# Patient Record
Sex: FEMALE | Race: WHITE | NOT HISPANIC OR LATINO | URBAN - METROPOLITAN AREA
[De-identification: names, ages, dates, MRNs, and addresses within clinical notes are randomized per-mention and may not be internally consistent; named-entity substitution may affect disease eponyms.]

---

## 2022-01-17 ENCOUNTER — EMERGENCY (EMERGENCY)
Facility: HOSPITAL | Age: 32
LOS: 1 days | Discharge: HOME | End: 2022-01-17
Admitting: EMERGENCY MEDICINE
Payer: COMMERCIAL

## 2022-01-17 VITALS
OXYGEN SATURATION: 99 % | SYSTOLIC BLOOD PRESSURE: 119 MMHG | RESPIRATION RATE: 16 BRPM | DIASTOLIC BLOOD PRESSURE: 74 MMHG | TEMPERATURE: 98 F | HEART RATE: 77 BPM

## 2022-01-17 VITALS
OXYGEN SATURATION: 100 % | HEART RATE: 89 BPM | SYSTOLIC BLOOD PRESSURE: 128 MMHG | RESPIRATION RATE: 18 BRPM | DIASTOLIC BLOOD PRESSURE: 77 MMHG | WEIGHT: 115.08 LBS | TEMPERATURE: 98 F

## 2022-01-17 DIAGNOSIS — R20.8 OTHER DISTURBANCES OF SKIN SENSATION: ICD-10-CM

## 2022-01-17 DIAGNOSIS — Z01.82 ENCOUNTER FOR ALLERGY TESTING: ICD-10-CM

## 2022-01-17 DIAGNOSIS — R21 RASH AND OTHER NONSPECIFIC SKIN ERUPTION: ICD-10-CM

## 2022-01-17 LAB
ALBUMIN SERPL ELPH-MCNC: 4 G/DL — SIGNIFICANT CHANGE UP (ref 3.4–5)
ALP SERPL-CCNC: 44 U/L — SIGNIFICANT CHANGE UP (ref 40–120)
ALT FLD-CCNC: 15 U/L — SIGNIFICANT CHANGE UP (ref 12–42)
ANION GAP SERPL CALC-SCNC: 8 MMOL/L — LOW (ref 9–16)
AST SERPL-CCNC: 13 U/L — LOW (ref 15–37)
BILIRUB SERPL-MCNC: 0.4 MG/DL — SIGNIFICANT CHANGE UP (ref 0.2–1.2)
BUN SERPL-MCNC: 12 MG/DL — SIGNIFICANT CHANGE UP (ref 7–23)
CALCIUM SERPL-MCNC: 8.8 MG/DL — SIGNIFICANT CHANGE UP (ref 8.5–10.5)
CHLORIDE SERPL-SCNC: 102 MMOL/L — SIGNIFICANT CHANGE UP (ref 96–108)
CO2 SERPL-SCNC: 28 MMOL/L — SIGNIFICANT CHANGE UP (ref 22–31)
CREAT SERPL-MCNC: 0.68 MG/DL — SIGNIFICANT CHANGE UP (ref 0.5–1.3)
GLUCOSE SERPL-MCNC: 149 MG/DL — HIGH (ref 70–99)
HCT VFR BLD CALC: 39.9 % — SIGNIFICANT CHANGE UP (ref 34.5–45)
HGB BLD-MCNC: 13.3 G/DL — SIGNIFICANT CHANGE UP (ref 11.5–15.5)
MCHC RBC-ENTMCNC: 29.5 PG — SIGNIFICANT CHANGE UP (ref 27–34)
MCHC RBC-ENTMCNC: 33.3 GM/DL — SIGNIFICANT CHANGE UP (ref 32–36)
MCV RBC AUTO: 88.5 FL — SIGNIFICANT CHANGE UP (ref 80–100)
NRBC # BLD: 0 /100 WBCS — SIGNIFICANT CHANGE UP (ref 0–0)
PLATELET # BLD AUTO: 231 K/UL — SIGNIFICANT CHANGE UP (ref 150–400)
POTASSIUM SERPL-MCNC: 4 MMOL/L — SIGNIFICANT CHANGE UP (ref 3.5–5.3)
POTASSIUM SERPL-SCNC: 4 MMOL/L — SIGNIFICANT CHANGE UP (ref 3.5–5.3)
PROT SERPL-MCNC: 7.2 G/DL — SIGNIFICANT CHANGE UP (ref 6.4–8.2)
RBC # BLD: 4.51 M/UL — SIGNIFICANT CHANGE UP (ref 3.8–5.2)
RBC # FLD: 12.6 % — SIGNIFICANT CHANGE UP (ref 10.3–14.5)
SODIUM SERPL-SCNC: 138 MMOL/L — SIGNIFICANT CHANGE UP (ref 132–145)
TSH SERPL-MCNC: 0.86 UIU/ML — SIGNIFICANT CHANGE UP (ref 0.36–3.74)
WBC # BLD: 7.93 K/UL — SIGNIFICANT CHANGE UP (ref 3.8–10.5)
WBC # FLD AUTO: 7.93 K/UL — SIGNIFICANT CHANGE UP (ref 3.8–10.5)

## 2022-01-17 PROCEDURE — 99284 EMERGENCY DEPT VISIT MOD MDM: CPT

## 2022-01-17 RX ORDER — DIPHENHYDRAMINE HCL 50 MG
25 CAPSULE ORAL ONCE
Refills: 0 | Status: COMPLETED | OUTPATIENT
Start: 2022-01-17 | End: 2022-01-17

## 2022-01-17 RX ORDER — FAMOTIDINE 10 MG/ML
20 INJECTION INTRAVENOUS ONCE
Refills: 0 | Status: COMPLETED | OUTPATIENT
Start: 2022-01-17 | End: 2022-01-17

## 2022-01-17 RX ORDER — DEXAMETHASONE 0.5 MG/5ML
10 ELIXIR ORAL ONCE
Refills: 0 | Status: COMPLETED | OUTPATIENT
Start: 2022-01-17 | End: 2022-01-17

## 2022-01-17 RX ADMIN — Medication 102 MILLIGRAM(S): at 17:05

## 2022-01-17 RX ADMIN — Medication 25 MILLIGRAM(S): at 17:05

## 2022-01-17 RX ADMIN — FAMOTIDINE 20 MILLIGRAM(S): 10 INJECTION INTRAVENOUS at 17:05

## 2022-01-17 NOTE — ED PROVIDER NOTE - CARE PLAN
Principal Discharge DX:	Skin rash   1 Principal Discharge DX:	Allergic rash present on examination

## 2022-01-17 NOTE — ED PROVIDER NOTE - NSFOLLOWUPINSTRUCTIONS_ED_ALL_ED_FT
1. Follow up with your Dermatologist appointment in 2 weeks  2. Return to the ED if worsening symptoms such as shortness of breath or throat tightness.    Rash    A rash is a change in the color of the skin. A rash can also change the way your skin feels. There are many different conditions and factors that can cause a rash, most of which are not dangerous. Make sure to follow up with your primary care physician or a dermatologist as instructed by your health care provider.    SEEK IMMEDIATE MEDICAL CARE IF YOU HAVE ANY OF THE FOLLOWING SYMPTOMS: fever, blisters, a rash inside your mouth, vaginal or anal pain, or altered mental status.

## 2022-01-17 NOTE — ED PROVIDER NOTE - CLINICAL SUMMARY MEDICAL DECISION MAKING FREE TEXT BOX
Skin rash with throat tightness x 10 days sent by dermatologist  labs & medications in ED, observe for symptoms relief. Skin rash with throat tightness x 10 days sent by dermatologist  labs & medications in ED, observe for symptoms relief.  labs - wnl. patient know to follow up with sent outs (per dermatology request)  symptoms improved in ED. Has Rx for Prednisone from dermatologist

## 2022-01-17 NOTE — ED PROVIDER NOTE - ENMT, MLM
Airway patent, (-) stridor,  Nasal mucosa clear. Mouth with normal mucosa. Throat has no vesicles, no oropharyngeal exudates and uvula is midline.

## 2022-01-17 NOTE — ED PROVIDER NOTE - PATIENT PORTAL LINK FT
normal... You can access the FollowMyHealth Patient Portal offered by [facility] by registering at the following website: [email or web address]. By joining [employer]’s FollowMyHealth portal, you will also be able to view your health information using other applications (apps) compatible with our system.

## 2022-01-17 NOTE — ED ADULT TRIAGE NOTE - CHIEF COMPLAINT QUOTE
pt sent by dermatologist for eval of rashes/hives x 2 weeks with intermittent anaphylaxis symptoms, concerned for autoimmune disorder. failed 3 day course of prednisone, requesting bloodwork and possible IV steroids

## 2022-01-17 NOTE — ED ADULT NURSE NOTE - NS ED NURSE DISCH DISPOSITION
Assessment  1  Preoperative examination (V72 84) (Z01 818)   2  Deformity of toe of left foot (735 9) (M20 62)    Plan  Preoperative examination    · EKG/ECG- POC; Status:Complete - Retrospective By Protocol Authorization;   Done:  81NMN7676 08:15AM    Discussion/Summary  Surgical Clearance: She is at a LOW risk from a cardiovascular standpoint at this time without any additional cardiac testing  Reevaluation needed, if she should present with symptoms prior to surgery/procedure  Surgical clearance faxed to Dr Pedro Page   Chief Complaint  Pt here for pre-opfoot       History of Present Illness  Pre-Op Visit (Brief): The patient is being seen for a preoperative visit  Surgical Risk Assessment:   Prior Anesthesia: She had prior anesthesia,-- no prior adverse reaction to edidural anesthesia,-- no prior adverse reaction to spinal anesthesia-- and-- no prior adverse reaction to general anesthesia  Pertinent Past Medical History: no CAD,-- no chronic liver disease,-- no acute hepatitis,-- no coagulation delay,-- no primary hypercoagulable state,-- no secondary hypercoagulable state,-- no diabetes,-- no CVA,-- no COPD-- and-- no renal disease  Exercise Capacity: able to walk four blocks without symptoms-- and-- able to walk two flights of stairs without symptoms  Lifestyle Factors: denies tobacco use and denies illegal drug use  Symptoms: no symptoms  Pertinent Family History: no pertinent family history  Living Situation: home is secure and supportive  HPI: foot bunionectomy + repair of toe deformity scheduled 11/3 with Dr Pedro Page  Monitored anesthesia  Had right foot surgery back in May  Went well, no complications  Looking forward to getting other foot done  No pain at present  No analgesics  acute complaints  PSH, FH reviewed  No perioperative issues  years ago (1980's)  Subsequent negative w/u  No recurrent clotting issues since  results from 10/16 reviewed (CBC, CMP)-->WNL            Review of Systems    Constitutional: no fever  ENT: no sore throat  Cardiovascular: no chest pain-- and-- no palpitations  Respiratory: no shortness of breath-- and-- no cough  Gastrointestinal: no abdominal pain,-- no nausea,-- no vomiting,-- no constipation,-- no diarrhea-- and-- no blood in stools  Genitourinary: no dysuria  Neurological: no headache-- and-- no dizziness  Hematologic/Lymphatic: no swollen glands,-- no tendency for easy bleeding-- and-- no tendency for easy bruising  Active Problems  1  History of Asthma, mild intermittent (493 90) (J45 20)   2  Back pain, chronic (724 5,338 29) (M54 9,G89 29)   3  Bunion, right foot (727 1) (M21 611)   4  Cervical post-laminectomy syndrome (722 81) (M96 1)   5  DJD (degenerative joint disease) of thoracic spine (721 2) (M47 814)   6  Encounter for postoperative care (V58 49) (Z48 89)   7  Encounter for screening mammogram for breast cancer (V76 12) (Z12 31)   8  Foot pain, bilateral (729 5) (M79 671,M79 672)   9  History of anemia (V12 3) (Z86 2)   10  History of gastroesophageal reflux (GERD) (V12 79) (Z87 19)   11  History of vitamin D deficiency (V12 1) (Z86 39)   12  Laboratory exam ordered as part of routine general medical examination (V72 62)    (Z00 00)   13  History of Left hip pain (719 45) (M25 552)   14  Leukopenia (288 50) (D72 819)   15  Myofascial pain syndrome (729 1) (M79 1)   16  Osteopenia (733 90) (M85 80)   17  Pain syndrome, chronic (338 4) (G89 4)   18  Postprocedural state (V45 89) (Z98 890)   19  Preoperative examination (V72 84) (Z01 818)   20  Preoperative examination (V72 84) (Z01 818)   21  Thoracic neuralgia (729 2) (M79 2)   22  Thoracic spondylosis without myelopathy (721 2) (M47 814)   23  History of Toe deformity (735 9) (M20 60)   24   Vitiligo (709 01) (L80)    Past Medical History   · History of Anxiety disorder (300 00) (F41 9)   · History of Asthma (493 90) (J45 909)   · History of Asthma, mild intermittent (493 90) (J45 20)   · Bunion, right foot (727 1) (M21 611)   · History of Cervical cancer screening (V76 2) (Z12 4)   · Deformity of toe of left foot (735 9) (M20 62)   · History of Discitis of thoracolumbar region (722 92) (M46 45)   · Foot pain, bilateral (729 5) (M79 671,M79 672)   · History of Fracture Of Wrist And Hand (818)   · History of abdominal pain (V13 89) (L04 798)   · History of anemia (V12 3) (Z86 2)   · History of arthritis (V13 4) (Z87 39)   · History of backache (V13 59) (Z87 39)   · History of fall (V15 88) (Z91 81)   · History of fibrocystic disease of breast (V13 89) (F77 951)   · History of gastroesophageal reflux (GERD) (V12 79) (Z87 19)   · History of heartburn (V12 79) (E35 174)   · History of hypercholesterolemia (V12 29) (Z86 39)   · History of low back pain (V13 59) (Z87 39)   · History of nausea (V12 79) (Z87 898)   · History of polyarthritis (V13 4) (Z87 39)   · History of seasonal allergies (V15 09) (Z88 9)   · History of shortness of breath (V13 89) (B21 547)   · History of sleep disturbance (V13 89) (Z87 898)   · History of vitamin D deficiency (V12 1) (Z86 39)   · History of Indigestion (536 8) (K30)   · Laboratory exam ordered as part of routine general medical examination (V72 62)  (Z00 00)   · History of Left hip pain (719 45) (M25 552)   · Leukopenia (288 50) (D72 819)   · Osteopenia (733 90) (M85 80)   · Preoperative examination (V72 84) (Z01 818)   · Preoperative examination (V72 84) (Z01 818)   · History of Sore throat (462) (J02 9)   · History of Toe deformity (735 9) (M20 60)   · History of Trochanteric bursitis of left hip (726 5) (M70 62)   · Vitiligo (709 01) (L80)   · History of Wears eyeglasses (V49 89) (Z97 3)    The active problems and past medical history were reviewed and updated today        Surgical History   · History of Back Surgery   · History of  Section   · History of Cholecystectomy   · History of Hand Surgery   · History of Install Peripheral Nerve Neurostimulator By Incision   · History of Neck Surgery   · History of Neuroplasty Decompression Median Nerve At Carpal Tunnel   · History of Neuroplasty Ulnar Nerve    The surgical history was reviewed and updated today  Family History  Mother    · Family history of dementia (V17 2) (Z80 11)  Father    · Family history of Emphysema lung   · Family history of lung cancer (V16 1) (Z80 1)  Paternal Grandfather    · Family history of gangrene (V19 4) (Z84 0)  Family History    · Family history of Hypertension (V17 49)   · Family history of Reported Prior Back Trouble    The family history was reviewed and updated today  Social History   · Denied: History of Alcohol Use (History)   · Denied: History of Currently sexually active   · High school or GED   · Marital History - Currently    · Never smoked tobacco (V49 89) (Z78 9)   · No drug use   · No known risk factors   · No known STD risk factors   · Occasional alcohol use   · Physical Disability:   · Denied: History of Tobacco Use   · Two children  The social history was reviewed and updated today  The social history was reviewed and is unchanged  Current Meds   1  Melatonin 3 MG Oral Capsule; TAKE 1 CAPSULE AT BEDTIME AS NEEDED; Therapy: (Recorded:26Hxp4633) to Recorded   2  Omeprazole 20 MG Oral Capsule Delayed Release; TAKE 1 CAPSULE ONCE DAILY AS   NEEDED FOR REFLUX; Therapy: 36BDM6580 to ([phone])  Requested for: 04GAX8449; Last   Rx:04Iks0801 Ordered   3  Vitamin B-12 1000 MCG Oral Tablet; TAKE 1 TABLET DAILY AS DIRECTED; Therapy: (Recorded:30Bnb5021) to Recorded   4  Vitamin D CAPS; take 1 capsule daily; Therapy: (Recorded:30Hxy4828) to Recorded    The medication list was reviewed and updated today  Allergies  1  Lyrica CAPS   2  Percocet TABS   3   Influenza (Split PF)    Vitals   Recorded: 23Oct2017 07:56AM   Temperature 98 F, Tympanic   Heart Rate 74   Systolic 371   Diastolic 80   Height 5 ft 1 in   Weight 146 lb 6 oz   BMI Calculated 27 66   BSA Calculated 1 65   O2 Saturation 98     Physical Exam    Constitutional   General appearance: No acute distress, well appearing and well nourished  Head and Face   Head and face: Normal     Eyes   Conjunctiva and lids: No swelling, erythema or discharge  Pupils and irises: Equal, round, reactive to light  Ears, Nose, Mouth, and Throat   External inspection of ears and nose: Normal     Otoscopic examination: Tympanic membranes translucent with normal light reflex  Canals patent without erythema  Nasal mucosa, septum, and turbinates: Normal without edema or erythema  Oropharynx: Normal with no erythema, edema, exudate or lesions  Neck   Neck: Supple, symmetric, trachea midline, no masses  Pulmonary   Respiratory effort: No increased work of breathing or signs of respiratory distress  Auscultation of lungs: Clear to auscultation  Cardiovascular   Auscultation of heart: Normal rate and rhythm, normal S1 and S2, no murmurs  Carotid pulses: 2+ bilaterally  Examination of extremities for edema and/or varicosities: Normal     Abdomen   Abdomen: Non-tender, no masses  Liver and spleen: No hepatomegaly or splenomegaly  Lymphatic   Palpation of lymph nodes in neck: No lymphadenopathy  Musculoskeletal   Gait and station: Normal     Joints, bones, and muscles: Abnormal  -- Left foot toe deformity  Psychiatric   Orientation to person, place, and time: Normal     Mood and affect: Normal        Results/Data  EKG/ECG- POC 23Oct2017 08:15AM David Holloway     Test Name Result Flag Reference   EKG/ECG 10/23/2017       A 12 lead ECG was performed and was normal    Rhythm and rate: normal sinus rhythm  P-waves: the P wave is normal    QRS: the QRS is normal    ST segment: the ST segments are normal    Comparison to prior ECGs:  no interval change  End of Encounter Meds  1  Vitamin B-12 1000 MCG Oral Tablet; TAKE 1 TABLET DAILY AS DIRECTED;    Therapy: (Recorded:69Apt7451) to Recorded  2  Omeprazole 20 MG Oral Capsule Delayed Release; TAKE 1 CAPSULE ONCE DAILY AS   NEEDED FOR REFLUX; Therapy: 46ZLF8298 to ([id number])  Requested for: 79WHN0150; Last   Rx:02May2017 Ordered  3  Melatonin 3 MG Oral Capsule; TAKE 1 CAPSULE AT BEDTIME AS NEEDED; Therapy: (Recorded:61Gxu4013) to Recorded   4  Vitamin D CAPS; take 1 capsule daily; Therapy: (Recorded:31Qvg9389) to Recorded    Future Appointments    Date/Time Provider Specialty Site   08/03/2018 09:00 AM Tilton Kanner, M D   Hematology [facility]     Signatures   Electronically signed by : KOBI Dobbs; Oct 23 2017  8:46AM EST                       (Author)    Electronically signed by : Tommy Causey DO; Oct 23 2017  8:48AM EST                       (Author) Discharged

## 2022-01-17 NOTE — ED PROVIDER NOTE - OBJECTIVE STATEMENT
30yo F no pmhx presents with 2 weeks of a consistent, itchy rash to her torso, back, head & neck. patient was originally seen at [facility] 1 week ago on 20mg QD prednisone without relief. Was sent to the ED from her Dermatology appointment today for further evaluation of throat tightness. Pt denies feeling short of breath and difficulty swallowing. States its more of a sensation.  She also notes the rash moves around day by day and gets worse with hot water. patient noted that she received the COVID-19 vaccine at the end of December where she experienced a mild rash, but not as severe as today.     Denies: Fevers, cough, URI symptoms, new medications, soaps or detergents or recent travel.

## 2022-01-18 LAB
A1C WITH ESTIMATED AVERAGE GLUCOSE RESULT: 5.1 % — SIGNIFICANT CHANGE UP (ref 4–5.6)
C3 SERPL-MCNC: 106 MG/DL — SIGNIFICANT CHANGE UP (ref 81–157)
C4 SERPL-MCNC: 25 MG/DL — SIGNIFICANT CHANGE UP (ref 13–39)
ESTIMATED AVERAGE GLUCOSE: 100 MG/DL — SIGNIFICANT CHANGE UP (ref 68–114)
T4 AB SER-ACNC: 8.1 UG/DL — SIGNIFICANT CHANGE UP (ref 4.6–12)
TESTOST FREE SERPL-MCNC: 1.8 PG/ML — SIGNIFICANT CHANGE UP (ref 0.1–6.3)

## 2022-01-19 LAB — TESTOST FREE+TOTAL PANEL SERPL-MCNC: 17.4 NG/DL — SIGNIFICANT CHANGE UP (ref 8.4–48.1)

## 2023-08-11 ENCOUNTER — INPATIENT (INPATIENT)
Facility: HOSPITAL | Age: 33
LOS: 0 days | Discharge: HOME | DRG: 395 | End: 2023-08-12
Attending: SURGERY | Admitting: SURGERY
Payer: SELF-PAY

## 2023-08-11 ENCOUNTER — TRANSCRIPTION ENCOUNTER (OUTPATIENT)
Age: 33
End: 2023-08-11

## 2023-08-11 VITALS
HEART RATE: 78 BPM | SYSTOLIC BLOOD PRESSURE: 123 MMHG | DIASTOLIC BLOOD PRESSURE: 84 MMHG | TEMPERATURE: 98 F | OXYGEN SATURATION: 99 % | RESPIRATION RATE: 18 BRPM

## 2023-08-11 VITALS
OXYGEN SATURATION: 99 % | HEIGHT: 63 IN | RESPIRATION RATE: 17 BRPM | TEMPERATURE: 98 F | SYSTOLIC BLOOD PRESSURE: 113 MMHG | HEART RATE: 96 BPM | DIASTOLIC BLOOD PRESSURE: 77 MMHG | WEIGHT: 126.99 LBS

## 2023-08-11 LAB
ALBUMIN SERPL ELPH-MCNC: 4.1 G/DL — SIGNIFICANT CHANGE UP (ref 3.4–5)
ALP SERPL-CCNC: 46 U/L — SIGNIFICANT CHANGE UP (ref 40–120)
ALT FLD-CCNC: 18 U/L — SIGNIFICANT CHANGE UP (ref 12–42)
ANION GAP SERPL CALC-SCNC: 7 MMOL/L — LOW (ref 9–16)
APPEARANCE UR: CLEAR — SIGNIFICANT CHANGE UP
AST SERPL-CCNC: 18 U/L — SIGNIFICANT CHANGE UP (ref 15–37)
BASOPHILS # BLD AUTO: 0.03 K/UL — SIGNIFICANT CHANGE UP (ref 0–0.2)
BASOPHILS NFR BLD AUTO: 0.3 % — SIGNIFICANT CHANGE UP (ref 0–2)
BILIRUB SERPL-MCNC: 0.6 MG/DL — SIGNIFICANT CHANGE UP (ref 0.2–1.2)
BILIRUB UR-MCNC: NEGATIVE — SIGNIFICANT CHANGE UP
BUN SERPL-MCNC: 10 MG/DL — SIGNIFICANT CHANGE UP (ref 7–23)
CALCIUM SERPL-MCNC: 9.1 MG/DL — SIGNIFICANT CHANGE UP (ref 8.5–10.5)
CHLORIDE SERPL-SCNC: 103 MMOL/L — SIGNIFICANT CHANGE UP (ref 96–108)
CO2 SERPL-SCNC: 28 MMOL/L — SIGNIFICANT CHANGE UP (ref 22–31)
COLOR SPEC: YELLOW — SIGNIFICANT CHANGE UP
CREAT SERPL-MCNC: 0.65 MG/DL — SIGNIFICANT CHANGE UP (ref 0.5–1.3)
DIFF PNL FLD: NEGATIVE — SIGNIFICANT CHANGE UP
EGFR: 120 ML/MIN/1.73M2 — SIGNIFICANT CHANGE UP
EOSINOPHIL # BLD AUTO: 0.04 K/UL — SIGNIFICANT CHANGE UP (ref 0–0.5)
EOSINOPHIL NFR BLD AUTO: 0.4 % — SIGNIFICANT CHANGE UP (ref 0–6)
GLUCOSE SERPL-MCNC: 100 MG/DL — HIGH (ref 70–99)
GLUCOSE UR QL: NEGATIVE MG/DL — SIGNIFICANT CHANGE UP
HCG SERPL-ACNC: <1 MIU/ML — SIGNIFICANT CHANGE UP
HCT VFR BLD CALC: 41.9 % — SIGNIFICANT CHANGE UP (ref 34.5–45)
HGB BLD-MCNC: 13.8 G/DL — SIGNIFICANT CHANGE UP (ref 11.5–15.5)
IMM GRANULOCYTES NFR BLD AUTO: 0.2 % — SIGNIFICANT CHANGE UP (ref 0–0.9)
KETONES UR-MCNC: ABNORMAL MG/DL
LEUKOCYTE ESTERASE UR-ACNC: NEGATIVE — SIGNIFICANT CHANGE UP
LIDOCAIN IGE QN: 91 U/L — SIGNIFICANT CHANGE UP (ref 73–393)
LYMPHOCYTES # BLD AUTO: 1.41 K/UL — SIGNIFICANT CHANGE UP (ref 1–3.3)
LYMPHOCYTES # BLD AUTO: 14.3 % — SIGNIFICANT CHANGE UP (ref 13–44)
MCHC RBC-ENTMCNC: 29.6 PG — SIGNIFICANT CHANGE UP (ref 27–34)
MCHC RBC-ENTMCNC: 32.9 GM/DL — SIGNIFICANT CHANGE UP (ref 32–36)
MCV RBC AUTO: 89.7 FL — SIGNIFICANT CHANGE UP (ref 80–100)
MONOCYTES # BLD AUTO: 0.53 K/UL — SIGNIFICANT CHANGE UP (ref 0–0.9)
MONOCYTES NFR BLD AUTO: 5.4 % — SIGNIFICANT CHANGE UP (ref 2–14)
NEUTROPHILS # BLD AUTO: 7.84 K/UL — HIGH (ref 1.8–7.4)
NEUTROPHILS NFR BLD AUTO: 79.4 % — HIGH (ref 43–77)
NITRITE UR-MCNC: NEGATIVE — SIGNIFICANT CHANGE UP
NRBC # BLD: 0 /100 WBCS — SIGNIFICANT CHANGE UP (ref 0–0)
PH UR: 8.5 (ref 5–8)
PLATELET # BLD AUTO: 199 K/UL — SIGNIFICANT CHANGE UP (ref 150–400)
POTASSIUM SERPL-MCNC: 4.4 MMOL/L — SIGNIFICANT CHANGE UP (ref 3.5–5.3)
POTASSIUM SERPL-SCNC: 4.4 MMOL/L — SIGNIFICANT CHANGE UP (ref 3.5–5.3)
PROT SERPL-MCNC: 7.5 G/DL — SIGNIFICANT CHANGE UP (ref 6.4–8.2)
PROT UR-MCNC: NEGATIVE MG/DL — SIGNIFICANT CHANGE UP
RBC # BLD: 4.67 M/UL — SIGNIFICANT CHANGE UP (ref 3.8–5.2)
RBC # FLD: 12.7 % — SIGNIFICANT CHANGE UP (ref 10.3–14.5)
SODIUM SERPL-SCNC: 138 MMOL/L — SIGNIFICANT CHANGE UP (ref 132–145)
SP GR SPEC: 1.01 — SIGNIFICANT CHANGE UP (ref 1–1.03)
UROBILINOGEN FLD QL: 0.2 MG/DL — SIGNIFICANT CHANGE UP (ref 0.2–1)
WBC # BLD: 9.87 K/UL — SIGNIFICANT CHANGE UP (ref 3.8–10.5)
WBC # FLD AUTO: 9.87 K/UL — SIGNIFICANT CHANGE UP (ref 3.8–10.5)

## 2023-08-11 PROCEDURE — 74177 CT ABD & PELVIS W/CONTRAST: CPT | Mod: 26

## 2023-08-11 PROCEDURE — 99285 EMERGENCY DEPT VISIT HI MDM: CPT

## 2023-08-11 PROCEDURE — 76856 US EXAM PELVIC COMPLETE: CPT | Mod: 26

## 2023-08-11 PROCEDURE — 76830 TRANSVAGINAL US NON-OB: CPT | Mod: 26

## 2023-08-11 RX ORDER — SODIUM CHLORIDE 9 MG/ML
1000 INJECTION INTRAMUSCULAR; INTRAVENOUS; SUBCUTANEOUS ONCE
Refills: 0 | Status: COMPLETED | OUTPATIENT
Start: 2023-08-11 | End: 2023-08-11

## 2023-08-11 RX ORDER — METRONIDAZOLE 500 MG
500 TABLET ORAL ONCE
Refills: 0 | Status: COMPLETED | OUTPATIENT
Start: 2023-08-11 | End: 2023-08-11

## 2023-08-11 RX ORDER — IOHEXOL 300 MG/ML
30 INJECTION, SOLUTION INTRAVENOUS ONCE
Refills: 0 | Status: COMPLETED | OUTPATIENT
Start: 2023-08-11 | End: 2023-08-11

## 2023-08-11 RX ORDER — CEFTRIAXONE 500 MG/1
1000 INJECTION, POWDER, FOR SOLUTION INTRAMUSCULAR; INTRAVENOUS ONCE
Refills: 0 | Status: COMPLETED | OUTPATIENT
Start: 2023-08-11 | End: 2023-08-11

## 2023-08-11 RX ORDER — KETOROLAC TROMETHAMINE 30 MG/ML
15 SYRINGE (ML) INJECTION ONCE
Refills: 0 | Status: DISCONTINUED | OUTPATIENT
Start: 2023-08-11 | End: 2023-08-11

## 2023-08-11 RX ADMIN — SODIUM CHLORIDE 1000 MILLILITER(S): 9 INJECTION INTRAMUSCULAR; INTRAVENOUS; SUBCUTANEOUS at 14:15

## 2023-08-11 RX ADMIN — IOHEXOL 30 MILLILITER(S): 300 INJECTION, SOLUTION INTRAVENOUS at 16:39

## 2023-08-11 RX ADMIN — CEFTRIAXONE 1000 MILLIGRAM(S): 500 INJECTION, POWDER, FOR SOLUTION INTRAMUSCULAR; INTRAVENOUS at 21:42

## 2023-08-11 RX ADMIN — Medication 500 MILLIGRAM(S): at 21:42

## 2023-08-11 RX ADMIN — Medication 100 MILLIGRAM(S): at 21:42

## 2023-08-11 RX ADMIN — SODIUM CHLORIDE 1000 MILLILITER(S): 9 INJECTION INTRAMUSCULAR; INTRAVENOUS; SUBCUTANEOUS at 21:42

## 2023-08-11 RX ADMIN — CEFTRIAXONE 100 MILLIGRAM(S): 500 INJECTION, POWDER, FOR SOLUTION INTRAMUSCULAR; INTRAVENOUS at 21:40

## 2023-08-11 NOTE — ED PROVIDER NOTE - CLINICAL SUMMARY MEDICAL DECISION MAKING FREE TEXT BOX
33-year-old female with no significant past medical history presenting with concern of severe abdominal pain while running. Differential diagnosis includes but is not limited to ectopic pregnancy, ovarian torsion, cyst rupture, appendicitis, UTI, mittelschmerz. Pt with stable VS. given pain is more isolated to pelvic region would get TVUS, urine studies, labs, and tx symptomatically. If US normal, would reassess her pain and consider additional imaging. if pain resolves and pt workup unremarkable would likely dc for the patient to follow up with her pmd.

## 2023-08-11 NOTE — ED PROVIDER NOTE - PHYSICAL EXAMINATION
General: well -appearing, no acute distress  Head: Atraumatic, normocephalic  Eyes: EOM grossly in tact, no scleral icterus, no discharge  ENT: moist mucous membranes  Neurology: A&Ox 3, nonfocal, BOLDEN x 4  Respiratory: normal respiratory effort  CV: Extremities warm and well perfused  Abdominal: Soft, non-distended, no masses  RLQ pain  On bimanual exam, severe R pelvic pain present  Extremities: No edema, no deformities  Skin: warm and dry. No rashes

## 2023-08-11 NOTE — ED PROVIDER NOTE - PROGRESS NOTE DETAILS
Valeriy, PGY-3, EM: pt reassessed, persistent abd pain, will get CTAP to evaluate for possible appendicitis. Valeriy, PGY-3, EM: CT showing tip appendicitis, will contact transfer center. pt aware of finding. ABX ordered.

## 2023-08-11 NOTE — H&P ADULT - NSICDXFAMILYHX_GEN_ALL_CORE_FT
FAMILY HISTORY:  Grandparent  Still living? Unknown  Family history of bowel cancer, Age at diagnosis: Age Unknown  FH: stomach cancer, Age at diagnosis: Age Unknown    Aunt  Still living? Unknown  FH: breast cancer, Age at diagnosis: Age Unknown

## 2023-08-11 NOTE — H&P ADULT - NSHPLABSRESULTS_GEN_ALL_CORE
LABS:                        13.8   9.87  )-----------( 199      ( 11 Aug 2023 13:31 )             41.9     08-11    138  |  103  |  10  ----------------------------<  100<H>  4.4   |  28  |  0.65    Ca    9.1      11 Aug 2023 13:31    TPro  7.5  /  Alb  4.1  /  TBili  0.6  /  DBili  x   /  AST  18  /  ALT  18  /  AlkPhos  46  08-11      CT Abdomen and Pelvis w/ Oral Cont and w/ IV Cont:   ACC: [id number] EXAM:  CT ABDOMEN AND PELVIS OC IC   ORDERED BY: LAZARO RASCON     PROCEDURE DATE:  08/11/2023          INTERPRETATION:  CLINICAL INFORMATION: Right lower quadrant pain.    COMPARISON: Ultrasound pelvis 8/11/2023.    CONTRAST/COMPLICATIONS:  IV Contrast: Omnipaque 350  97 cc administered   3 cc discarded  Oral Contrast: Omnipaque 300  Complications: None reported at time of study completion    PROCEDURE:  CT of the Abdomen and Pelvis was performed.  Sagittal and coronal reformats were performed.    FINDINGS:  LOWER CHEST: Within normal limits.    LIVER: Within normal limits.  BILE DUCTS: Limits of normal in caliber measuring 7.3 mm. There is no   intrahepatic radicle dilatation. There is no evidence of acute   cholecystitis or cholelithiasis. No CT evidence of choledocholithiasis.   Pancreatic duct is nondilated. GALLBLADDER: Within normal limits.  SPLEEN: Within normal limits.  PANCREAS: Within normal limits.  ADRENALS: Within normal limits.  KIDNEYS/URETERS: Bilateral extrarenal pelvis. No hydronephrosis or   nephrolithiasis.    BLADDER: Within normal limits.  REPRODUCTIVE ORGANS: 1.7 cm left ovarian dominant follicle/corpus luteum.    BOWEL: No bowel obstruction. The proximal appendix is of normal caliber   and fills with oral contrast. However, the distal tip of the appendix is   mildly dilated at 7 mm and demonstrates mild mural hyperenhancement and   subtle periappendicular fat stranding (series 602, image 39 and 40;   series 2, image 71). There is increased colonic stool burden compatible   with constipation.  PERITONEUM: No ascites.  VESSELS: Within normal limits.  RETROPERITONEUM/LYMPH NODES: No lymphadenopathy.  ABDOMINAL WALL: Within normal limits.  BONES: Within normal limits.    IMPRESSION:  The tip of the appendix is mildly dilated with mild mural   hyperenhancement and subtle periappendicular fat stranding. These   findings are suspicious for early, uncomplicated tip appendicitis.    --- End of Report ---          TAVIA VU MD; Resident Radiologist  This document has been electronically signed.  JUAN CARLOS KAUR MD; Attending Radiologist  This document has been electronically signed. Aug 11 2023  8:06PM (08-11-23 @ 18:17)

## 2023-08-11 NOTE — H&P ADULT - ASSESSMENT
34yo F with PMHx of ovarian cysts vasovagal syncope and no PSHx presented to [facility] with 1d history of lightheadedness, RLQ crampy abdominal pain and pelvic pain with CT A/P concerning for distal tip early appendicitis. Admitted to [facility] surgical team for management of early appendicitis.      Plan  NPO/IVF  Ceftriaxone/Flagyl  Pain and nausea prn  AM labs

## 2023-08-11 NOTE — ED ADULT NURSE NOTE - NSFALLUNIVINTERV_ED_ALL_ED
Bed/Stretcher in lowest position, wheels locked, appropriate side rails in place/Call bell, personal items and telephone in reach/Instruct patient to call for assistance before getting out of bed/chair/stretcher/Non-slip footwear applied when patient is off stretcher/[place] to call system/Physically safe environment - no spills, clutter or unnecessary equipment/Purposeful proactive rounding/Room/bathroom lighting operational, light cord in reach

## 2023-08-11 NOTE — H&P ADULT - HISTORY OF PRESENT ILLNESS
34yo F with PMHx of ovarian cysts vasovagal syncope and no PSHx presented to [facility] with 1d history of lightheadedness, RLQ crampy abdominal pain and pelvic pain. LMP was 2 weeks ago.  Patient states she has had non-specific fluctuant RLQ pain over the past year however today's pain was crampy and caused a generalized unwell feeling. Patient denies nausea and vomiting. Reports an episode of non-bloody diarrhea this morning. Patient reports previous history of bloody bowel movements in the past year however denies consulting a doctor. No history of previous colonoscopy. Patient reports family history of "rare bowel cancer" in maternal grandmother.     In ED: UA negative, no leukocyotosis, labs wnl, afebrile, hemodynamically stable, transvaginal US showed no ovarian torsion; CT Abd/pel concerning for distal appendix tip dilation of 7mm with mild mural hyperenhancement and periappendicular fat stranding. Patient received ceftriaxone and flagyl. Patient transported to [facility] admitted to surgical team for management of concerns for distal tip appendicitis.

## 2023-08-11 NOTE — H&P ADULT - NSHPPHYSICALEXAM_GEN_ALL_CORE
T(C): 36.8 (08-11-23 @ 23:36), Max: 36.8 (08-11-23 @ 23:36)  HR: 78 (08-11-23 @ 23:36) (78 - 96)  BP: 123/84 (08-11-23 @ 23:36) (100/64 - 128/81)  RR: 18 (08-11-23 @ 23:36) (16 - 18)  SpO2: 99% (08-11-23 @ 23:36) (99% - 100%)    CONSTITUTIONAL: Well groomed, no apparent distress  EYES: PERRLA and symmetric, EOMI, No conjunctival or scleral injection, non-icteric  ENMT: Oral mucosa with moist membranes. Normal dentition             NECK: Supple, symmetric and without tracheal deviation   RESP: No respiratory distress, no use of accessory muscles  CV: RRR, +S1S2, no MRG; no JVD; no peripheral edema  GI: Soft, ND, no rebound, no guarding; TTP of RLQ  NEURO: CN II-XII intact; sensation intact in upper and lower extremities b/l to light touch   PSYCH: Appropriate insight/judgment; A+O x 3, mood and affect appropriate, recent/remote memory intact

## 2023-08-11 NOTE — ED ADULT TRIAGE NOTE - CHIEF COMPLAINT QUOTE
patient c/o extreme groin pain after running which caused her to feel "faint". [facility] to [facility]. still feels "faint"

## 2023-08-11 NOTE — ED PROVIDER NOTE - ATTENDING CONTRIBUTION TO CARE
Young woman presenting with acute onset severe right lower quadrant pain while running today.  The symptoms were associated with lightheadedness and nausea.  She does report having history of vasovagal syncope.  Particularly with pain such as menstrual cramps.  She is currently in the middle of her menstrual cycle.  She also reports on and off right lower quadrant and right middle quadrant discomfort for some time periodically.  She has never had this evaluated.  She does however have a history of ovarian cysts.  Her vital signs are stable and on exam she is mildly tender in the right lower and right middle quadrant.  Her pelvic ultrasound was unremarkable and a CT AP was ordered.

## 2023-08-11 NOTE — ED PROVIDER NOTE - OBJECTIVE STATEMENT
33-year-old female with no significant past medical history presenting with concern of severe abdominal pain while running.  Patient states that she is run down because she has not been sleeping and has been working a lot.  She went out for a run today, she is not an avid runner but she was taking it easy when all of a sudden she felt a severe pain in her abdomen and felt as though she may pass out.  She describes the pain as a cramping pain. She sat on a bench some bystander got her water and they sent her to urgent care.  When she was there they checked her vital signs and she was hypertensive, and she says her heart rate was fine but they did not do any other testing.  On arrival here she still experiencing some right pelvic pain.  But she no longer feels like she is going to pass out.  She denies any prior abdominal surgeries.  LMP was at the end of last month.  She denies any vaginal bleeding. Hx of ovarian cysts in the past.

## 2023-08-11 NOTE — ED PROVIDER NOTE - NS ED ATTENDING STATEMENT MOD
I have seen and examined this patient and fully participated in the care of this patient as the teaching attending.  The service was shared with the ALESIA.  I reviewed and verified the documentation and independently performed the documented:

## 2023-08-12 RX ORDER — METRONIDAZOLE 500 MG
500 TABLET ORAL EVERY 8 HOURS
Refills: 0 | Status: DISCONTINUED | OUTPATIENT
Start: 2023-08-12 | End: 2023-08-12

## 2023-08-12 RX ORDER — CEFTRIAXONE 500 MG/1
1000 INJECTION, POWDER, FOR SOLUTION INTRAMUSCULAR; INTRAVENOUS EVERY 24 HOURS
Refills: 0 | Status: DISCONTINUED | OUTPATIENT
Start: 2023-08-12 | End: 2023-08-12

## 2023-08-12 RX ORDER — SODIUM CHLORIDE 9 MG/ML
1000 INJECTION, SOLUTION INTRAVENOUS
Refills: 0 | Status: DISCONTINUED | OUTPATIENT
Start: 2023-08-12 | End: 2023-08-12

## 2023-08-12 RX ORDER — ONDANSETRON 8 MG/1
4 TABLET, FILM COATED ORAL EVERY 6 HOURS
Refills: 0 | Status: DISCONTINUED | OUTPATIENT
Start: 2023-08-12 | End: 2023-08-12

## 2023-08-12 NOTE — DISCHARGE NOTE NURSING/CASE MANAGEMENT/SOCIAL WORK - NSDCPEFALRISK_GEN_ALL_CORE
For information on Fall & Injury Prevention, visit: [email or web address] OR  [email or web address] OR  https://www.cdc.gov/steadi/patient.html

## 2023-08-12 NOTE — DISCHARGE NOTE PROVIDER - HOSPITAL COURSE
34yo F with PMHx of ovarian cysts and vasovagal syncope presenting to [facility] with 1d hx of RLQ abdominal and pelvic pain. Patient received IV abx in the ED prior to arriving at [facility]. Admitted to [facility] surgery for CT A/P findings of tip appendicitis. Patient was informed on the treatment options for appendicitis, patient was aware of risks and elected to go home on oral antibiotics. Patient was counselled on symptoms and signs that would prompt return to ED in the setting of her current condition. Patient will follow up outpatient with Dr. Pruett.

## 2023-08-12 NOTE — DISCHARGE NOTE PROVIDER - CARE PROVIDER_API CALL
Fern Pruett  Surgery  [address] [place] [address], [address]  [place] [address]  Phone: [phone]  Fax: [phone]  Follow Up Time: 2 weeks

## 2023-08-12 NOTE — DISCHARGE NOTE PROVIDER - NSDCFUADDINST_GEN_ALL_CORE_FT
General Discharge Instructions:  Please resume all regular home medications unless specifically advised not to take a particular medication. Also, please take any new medications as prescribed. You have been prescribed 2 weeks of oral antibiotics. Please complete the full course of this antibiotic and take as prescribed  Please get plenty of rest, continue to ambulate several times per day, and drink adequate amounts of fluids.  Please follow-up with your surgeon and Primary Care Provider (PCP) as advised.    Warning Signs:  Please call your doctor or nurse practitioner if you experience the following:  *You experience new chest pain, pressure, squeezing or tightness.  *New or worsening cough, shortness of breath, or wheeze.  *If you are vomiting and cannot keep down fluids or your medications.  *You are getting dehydrated due to continued vomiting, diarrhea, or other reasons. Signs of dehydration include dry mouth, rapid heartbeat, or feeling dizzy or faint when standing.  *You see blood or dark/black material when you vomit or have a bowel movement.  *You experience burning when you urinate, have blood in your urine, or experience a discharge.  *Your pain is not improving within 8-12 hours or is not gone within 24 hours. Call or return immediately if your pain is getting worse, changes location, or moves to your chest or back.  *You have shaking chills, or fever greater than 101.5 degrees Fahrenheit or 38 degrees Celsius.  *Any change in your symptoms, or any new symptoms that concern you.     Please return to the ED if you experience any of the following symptoms:  Fever, chills, nausea associated with vomiting, changes in your bowel movements (including but not limited to: constipation, increased diarrhea, or blood in your stool)  If your abdominal pain worsens please consult your doctor

## 2023-08-12 NOTE — DISCHARGE NOTE NURSING/CASE MANAGEMENT/SOCIAL WORK - PATIENT PORTAL LINK FT
You can access the FollowMyHealth Patient Portal offered by [facility] by registering at the following website: [email or web address]. By joining [employer]’s FollowMyHealth portal, you will also be able to view your health information using other applications (apps) compatible with our system.

## 2023-08-13 ENCOUNTER — EMERGENCY (EMERGENCY)
Facility: HOSPITAL | Age: 33
LOS: 1 days | Discharge: HOME | End: 2023-08-13
Attending: STUDENT IN AN ORGANIZED HEALTH CARE EDUCATION/TRAINING PROGRAM | Admitting: STUDENT IN AN ORGANIZED HEALTH CARE EDUCATION/TRAINING PROGRAM
Payer: COMMERCIAL

## 2023-08-13 VITALS
SYSTOLIC BLOOD PRESSURE: 130 MMHG | RESPIRATION RATE: 16 BRPM | OXYGEN SATURATION: 98 % | WEIGHT: 128.09 LBS | HEIGHT: 63 IN | HEART RATE: 84 BPM | DIASTOLIC BLOOD PRESSURE: 85 MMHG | TEMPERATURE: 98 F

## 2023-08-13 DIAGNOSIS — R10.31 RIGHT LOWER QUADRANT PAIN: ICD-10-CM

## 2023-08-13 DIAGNOSIS — R11.0 NAUSEA: ICD-10-CM

## 2023-08-13 DIAGNOSIS — Z85.038 PERSONAL HISTORY OF OTHER MALIGNANT NEOPLASM OF LARGE INTESTINE: ICD-10-CM

## 2023-08-13 DIAGNOSIS — Z87.19 PERSONAL HISTORY OF OTHER DISEASES OF THE DIGESTIVE SYSTEM: ICD-10-CM

## 2023-08-13 DIAGNOSIS — R19.5 OTHER FECAL ABNORMALITIES: ICD-10-CM

## 2023-08-13 DIAGNOSIS — Z87.42 PERSONAL HISTORY OF OTHER DISEASES OF THE FEMALE GENITAL TRACT: ICD-10-CM

## 2023-08-13 LAB
CULTURE RESULTS: SIGNIFICANT CHANGE UP
SPECIMEN SOURCE: SIGNIFICANT CHANGE UP

## 2023-08-13 PROCEDURE — 99285 EMERGENCY DEPT VISIT HI MDM: CPT

## 2023-08-14 VITALS
SYSTOLIC BLOOD PRESSURE: 110 MMHG | HEART RATE: 82 BPM | DIASTOLIC BLOOD PRESSURE: 80 MMHG | TEMPERATURE: 98 F | RESPIRATION RATE: 18 BRPM | OXYGEN SATURATION: 98 %

## 2023-08-14 PROBLEM — Z78.9 OTHER SPECIFIED HEALTH STATUS: Chronic | Status: ACTIVE | Noted: 2022-01-17

## 2023-08-14 LAB
ALBUMIN SERPL ELPH-MCNC: 4.3 G/DL — SIGNIFICANT CHANGE UP (ref 3.3–5)
ALP SERPL-CCNC: 42 U/L — SIGNIFICANT CHANGE UP (ref 40–120)
ALT FLD-CCNC: 10 U/L — SIGNIFICANT CHANGE UP (ref 10–45)
ANION GAP SERPL CALC-SCNC: 10 MMOL/L — SIGNIFICANT CHANGE UP (ref 5–17)
APPEARANCE UR: CLEAR — SIGNIFICANT CHANGE UP
APTT BLD: 32.6 SEC — SIGNIFICANT CHANGE UP (ref 24.5–35.6)
AST SERPL-CCNC: 17 U/L — SIGNIFICANT CHANGE UP (ref 10–40)
BACTERIA # UR AUTO: PRESENT /HPF
BASOPHILS # BLD AUTO: 0.02 K/UL — SIGNIFICANT CHANGE UP (ref 0–0.2)
BASOPHILS NFR BLD AUTO: 0.2 % — SIGNIFICANT CHANGE UP (ref 0–2)
BILIRUB SERPL-MCNC: 0.4 MG/DL — SIGNIFICANT CHANGE UP (ref 0.2–1.2)
BILIRUB UR-MCNC: NEGATIVE — SIGNIFICANT CHANGE UP
BLD GP AB SCN SERPL QL: NEGATIVE — SIGNIFICANT CHANGE UP
BUN SERPL-MCNC: 8 MG/DL — SIGNIFICANT CHANGE UP (ref 7–23)
CALCIUM SERPL-MCNC: 9.6 MG/DL — SIGNIFICANT CHANGE UP (ref 8.4–10.5)
CHLORIDE SERPL-SCNC: 100 MMOL/L — SIGNIFICANT CHANGE UP (ref 96–108)
CO2 SERPL-SCNC: 27 MMOL/L — SIGNIFICANT CHANGE UP (ref 22–31)
COLOR SPEC: YELLOW — SIGNIFICANT CHANGE UP
CREAT SERPL-MCNC: 0.72 MG/DL — SIGNIFICANT CHANGE UP (ref 0.5–1.3)
DIFF PNL FLD: NEGATIVE — SIGNIFICANT CHANGE UP
EGFR: 114 ML/MIN/1.73M2 — SIGNIFICANT CHANGE UP
EOSINOPHIL # BLD AUTO: 0.14 K/UL — SIGNIFICANT CHANGE UP (ref 0–0.5)
EOSINOPHIL NFR BLD AUTO: 1.7 % — SIGNIFICANT CHANGE UP (ref 0–6)
EPI CELLS # UR: ABNORMAL /HPF (ref 0–5)
GLUCOSE SERPL-MCNC: 121 MG/DL — HIGH (ref 70–99)
GLUCOSE UR QL: NEGATIVE — SIGNIFICANT CHANGE UP
HCG SERPL-ACNC: <0 MIU/ML — SIGNIFICANT CHANGE UP
HCT VFR BLD CALC: 39.9 % — SIGNIFICANT CHANGE UP (ref 34.5–45)
HGB BLD-MCNC: 13.4 G/DL — SIGNIFICANT CHANGE UP (ref 11.5–15.5)
IMM GRANULOCYTES NFR BLD AUTO: 0.2 % — SIGNIFICANT CHANGE UP (ref 0–0.9)
INR BLD: 0.95 — SIGNIFICANT CHANGE UP (ref 0.85–1.18)
KETONES UR-MCNC: NEGATIVE — SIGNIFICANT CHANGE UP
LEUKOCYTE ESTERASE UR-ACNC: ABNORMAL
LYMPHOCYTES # BLD AUTO: 3.08 K/UL — SIGNIFICANT CHANGE UP (ref 1–3.3)
LYMPHOCYTES # BLD AUTO: 38.3 % — SIGNIFICANT CHANGE UP (ref 13–44)
MCHC RBC-ENTMCNC: 29.5 PG — SIGNIFICANT CHANGE UP (ref 27–34)
MCHC RBC-ENTMCNC: 33.6 GM/DL — SIGNIFICANT CHANGE UP (ref 32–36)
MCV RBC AUTO: 87.7 FL — SIGNIFICANT CHANGE UP (ref 80–100)
MONOCYTES # BLD AUTO: 0.6 K/UL — SIGNIFICANT CHANGE UP (ref 0–0.9)
MONOCYTES NFR BLD AUTO: 7.5 % — SIGNIFICANT CHANGE UP (ref 2–14)
NEUTROPHILS # BLD AUTO: 4.19 K/UL — SIGNIFICANT CHANGE UP (ref 1.8–7.4)
NEUTROPHILS NFR BLD AUTO: 52.1 % — SIGNIFICANT CHANGE UP (ref 43–77)
NITRITE UR-MCNC: NEGATIVE — SIGNIFICANT CHANGE UP
NRBC # BLD: 0 /100 WBCS — SIGNIFICANT CHANGE UP (ref 0–0)
PH UR: 7.5 — SIGNIFICANT CHANGE UP (ref 5–8)
PLATELET # BLD AUTO: 188 K/UL — SIGNIFICANT CHANGE UP (ref 150–400)
POTASSIUM SERPL-MCNC: 3.7 MMOL/L — SIGNIFICANT CHANGE UP (ref 3.5–5.3)
POTASSIUM SERPL-SCNC: 3.7 MMOL/L — SIGNIFICANT CHANGE UP (ref 3.5–5.3)
PROT SERPL-MCNC: 7 G/DL — SIGNIFICANT CHANGE UP (ref 6–8.3)
PROT UR-MCNC: NEGATIVE MG/DL — SIGNIFICANT CHANGE UP
PROTHROM AB SERPL-ACNC: 10.9 SEC — SIGNIFICANT CHANGE UP (ref 9.5–13)
RBC # BLD: 4.55 M/UL — SIGNIFICANT CHANGE UP (ref 3.8–5.2)
RBC # FLD: 12.7 % — SIGNIFICANT CHANGE UP (ref 10.3–14.5)
RBC CASTS # UR COMP ASSIST: < 5 /HPF — SIGNIFICANT CHANGE UP
RH IG SCN BLD-IMP: POSITIVE — SIGNIFICANT CHANGE UP
RH IG SCN BLD-IMP: POSITIVE — SIGNIFICANT CHANGE UP
SODIUM SERPL-SCNC: 137 MMOL/L — SIGNIFICANT CHANGE UP (ref 135–145)
SP GR SPEC: 1.01 — SIGNIFICANT CHANGE UP (ref 1–1.03)
UROBILINOGEN FLD QL: 0.2 E.U./DL — SIGNIFICANT CHANGE UP
WBC # BLD: 8.05 K/UL — SIGNIFICANT CHANGE UP (ref 3.8–10.5)
WBC # FLD AUTO: 8.05 K/UL — SIGNIFICANT CHANGE UP (ref 3.8–10.5)
WBC UR QL: < 5 /HPF — SIGNIFICANT CHANGE UP

## 2023-08-14 PROCEDURE — 85610 PROTHROMBIN TIME: CPT

## 2023-08-14 PROCEDURE — 86901 BLOOD TYPING SEROLOGIC RH(D): CPT

## 2023-08-14 PROCEDURE — 86850 RBC ANTIBODY SCREEN: CPT

## 2023-08-14 PROCEDURE — 81001 URINALYSIS AUTO W/SCOPE: CPT

## 2023-08-14 PROCEDURE — 99284 EMERGENCY DEPT VISIT MOD MDM: CPT | Mod: 25

## 2023-08-14 PROCEDURE — 96374 THER/PROPH/DIAG INJ IV PUSH: CPT | Mod: XU

## 2023-08-14 PROCEDURE — 85730 THROMBOPLASTIN TIME PARTIAL: CPT

## 2023-08-14 PROCEDURE — 74177 CT ABD & PELVIS W/CONTRAST: CPT | Mod: MA

## 2023-08-14 PROCEDURE — 84702 CHORIONIC GONADOTROPIN TEST: CPT

## 2023-08-14 PROCEDURE — 80053 COMPREHEN METABOLIC PANEL: CPT

## 2023-08-14 PROCEDURE — 74177 CT ABD & PELVIS W/CONTRAST: CPT | Mod: 26,MA

## 2023-08-14 PROCEDURE — 86900 BLOOD TYPING SEROLOGIC ABO: CPT

## 2023-08-14 PROCEDURE — 36415 COLL VENOUS BLD VENIPUNCTURE: CPT

## 2023-08-14 PROCEDURE — 85025 COMPLETE CBC W/AUTO DIFF WBC: CPT

## 2023-08-14 RX ORDER — SODIUM CHLORIDE 9 MG/ML
1000 INJECTION INTRAMUSCULAR; INTRAVENOUS; SUBCUTANEOUS ONCE
Refills: 0 | Status: COMPLETED | OUTPATIENT
Start: 2023-08-14 | End: 2023-08-14

## 2023-08-14 RX ORDER — IOHEXOL 300 MG/ML
30 INJECTION, SOLUTION INTRAVENOUS ONCE
Refills: 0 | Status: COMPLETED | OUTPATIENT
Start: 2023-08-14 | End: 2023-08-14

## 2023-08-14 RX ADMIN — Medication 0.5 MILLIGRAM(S): at 01:28

## 2023-08-14 RX ADMIN — IOHEXOL 30 MILLILITER(S): 300 INJECTION, SOLUTION INTRAVENOUS at 00:58

## 2023-08-14 RX ADMIN — SODIUM CHLORIDE 1000 MILLILITER(S): 9 INJECTION INTRAMUSCULAR; INTRAVENOUS; SUBCUTANEOUS at 00:46

## 2023-08-14 NOTE — ED PROVIDER NOTE - OBJECTIVE STATEMENT
33 F denies pmh ovarian cyst, recent appendicitis dx (8/11/23) opted for abx rather than surgery, returns today c/o worsening RLQ abd pain x24 hrs.  pt was admitted to [facility] surgery (different MR # [id number]) for early tip appendicitis.  pt opted for dc w/ abx, taking augmentin but worsening pain over past 24 hrs.  + nausea, no vomiting.  denies f/c, HA, dizziness, uri sxs, cp/sob, urinary sxs, trauma

## 2023-08-14 NOTE — ED PROVIDER NOTE - NS ED ATTENDING STATEMENT MOD
This was a shared visit with the ALESIA. I reviewed and verified the documentation and independently performed the documented:

## 2023-08-14 NOTE — ED PROVIDER NOTE - PHYSICAL EXAMINATION
Vitals reviewed  Gen: anxious appearing, nad, speaking in full sentences  Skin: wwp, no rash/lesions  HEENT: ncat, eomi, mmm  CV: rrr, no audible m/r/g  Resp: symmetrical expansion, ctab, no w/r/r  Abd: nondistended, soft, + RLQ ttp, no r/g, no cvat   Ext: FROM throughout, no peripheral edema  Neuro: alert/oriented, no focal deficits, steady gait

## 2023-08-14 NOTE — ED PROVIDER NOTE - CLINICAL SUMMARY MEDICAL DECISION MAKING FREE TEXT BOX
33 F denies pmh ovarian cyst, recent appendicitis dx (8/11/23) opted for abx rather than surgery, returns today c/o worsening RLQ abd pain x24 hrs.  on exam vss, anxious but comfortable appearing, abd nondistended/soft, + RLQ ttp, no r/g.  will obtain preop labs, c/s surg, +/- rpt ct

## 2023-08-14 NOTE — ED ADULT NURSE NOTE - CAS ELECT INFOMATION PROVIDED
"Chief Complaint   Patient presents with     Post Partum Exam     DOD        Initial /88 (BP Location: Left arm, Patient Position: Chair, Cuff Size: Adult Regular)  Pulse 94  Wt 188 lb (85.3 kg)  Breastfeeding? No  BMI 33.84 kg/m2 Estimated body mass index is 33.84 kg/(m^2) as calculated from the following:    Height as of 2/9/17: 5' 2.5\" (1.588 m).    Weight as of this encounter: 188 lb (85.3 kg).  Medication Reconciliation: complete   Gu[place]ro[place] JULIET Jay      " DC instructions

## 2023-08-14 NOTE — ED ADULT NURSE NOTE - NSFALLRISKASMT_ED_ALL_ED_DT
An Appt was scheduled for 6/15/23 with Dr Ar Kuhn  [facility] ref[facility]  But when trying to assign the referral to the appt, it is not showing on the list  IT has been contacted, spoke with [facility]  She took screen shots and will send them to her TEAM to look into  14-Aug-2023 00:54

## 2023-08-14 NOTE — ED ADULT NURSE NOTE - NS ED NURSE LEVEL OF CONSCIOUSNESS SPEECH
Refill request from Pharmacy   Medication Losartan 50 MG, Atorvastatin 10 MG  LOV 10/22/1029  NOV 05/11/2020  Last fill date 11/11/2019  Last lab BMP 04/30/2019  Medication filled per protocol      
Speaking Coherently

## 2023-08-14 NOTE — ED ADULT NURSE NOTE - JUGULAR VENOUS DISTENTION
absent Complex Repair And Double M Plasty Text: The defect edges were debeveled with a #15 scalpel blade.  The primary defect was closed partially with a complex linear closure.  Given the location of the remaining defect, shape of the defect and the proximity to free margins a double M plasty was deemed most appropriate for complete closure of the defect.  Using a sterile surgical marker, an appropriate advancement flap was drawn incorporating the defect and placing the expected incisions within the relaxed skin tension lines where possible.    The area thus outlined was incised deep to adipose tissue with a #15 scalpel blade.  The skin margins were undermined to an appropriate distance in all directions utilizing iris scissors.

## 2023-08-14 NOTE — ED PROVIDER NOTE - NSFOLLOWUPINSTRUCTIONS_ED_ALL_ED_FT
Please rest and remain well hydrated with plenty of fluids.  You can take motrin 600-800mg and tylenol 650mg every 3 hours, switching between the two for pain/bodyaches or fevers (>100.4F/>38C)    Please finish full course of antibiotics as prescribed    Call today to arrange follow up with surgeon Dr. Pruett

## 2023-08-14 NOTE — ED PROVIDER NOTE - PROGRESS NOTE DETAILS
labs wnl, ct final rd shows no acute appendicitis.  seen by surg.  recommend finish course of abx and f/u outpt w/ Dr. Pruett.  discussed strict return parameters

## 2023-08-14 NOTE — CONSULT NOTE ADULT - ASSESSMENT
The patient is a 33 year old female recently admitted with appendicitis dc'd on PO Abx now presenting with right sided abdominal pain.   On Exam abdomen is soft, mildly tender right lower abdomen, nondistended  Imaging demonstrated no appendicitis  Pain unlikely from appendix.    Recommendations  No acute surgical intervention at this time  Continue PO Abx regimen  Call Dr. Pruett's office to schedule follow for today/tomorrow  Miralax for constipation  Plan discussed with attending and chief resident.   ____________________________________________________

## 2023-08-14 NOTE — ED PROVIDER NOTE - CARE PROVIDER_API CALL
Fern Pruett  Surgery  [address] [place] [address], [address]  [place] [address]  Phone: [phone]  Fax: [phone]  Follow Up Time:

## 2023-08-14 NOTE — CONSULT NOTE ADULT - SUBJECTIVE AND OBJECTIVE BOX
SURGERY ADDENDUM  The patient is a 33 year old female with a PMHx of ovarian cysts, vasovagal syncope and recent admission  for acute appendicitis, offered surgical intervention however patient opted for treatment with PO antibiotics and strict return precautions and outpatient follow up. She now presents with recurrent right sided abdominal pain for 1 day associated with nausea, no emesis and two episodes of nonbloody diarrhea. The pain initially restarted on Sat Night/ morning around her umbilicus and then became a sharp intermitent pain in RLQ, occassionally radiating to LLQ. She denies fever/chills, chest pain, changes in urination.    PMHx - ovarian cysts, vasovagal syncope, appendicitis  PSHx - none  Meds - none  Allergies - none  SoHx - EtOH socially, no tobacco, no red drugs  FamHx - bowel ca in maternal grandmother  CScope - never    In the ED she is afebrile, nontachycardic, normotensive, and satting well on RA.   On exam she is soft, mildly tender right abodmen, nondistended.  Labs with WBC 8 no left shift, Hgb 13.4, CMP wnl, UA neg  CTAP with no appendicitis    PAST MEDICAL & SURGICAL HISTORY:  No pertinent past medical history      No significant past surgical history        MEDICATIONS  (STANDING):    MEDICATIONS  (PRN):      Allergies    No Known Allergies    Intolerances        SOCIAL HISTORY:    FAMILY HISTORY:      Vital Signs Last 24 Hrs  T(C): 36.9 (14 Aug 2023 01:20), Max: 36.9 (14 Aug 2023 01:20)  T(F): 98.4 (14 Aug 2023 01:20), Max: 98.4 (14 Aug 2023 01:20)  HR: 84 (14 Aug 2023 02:03) (82 - 84)  BP: 103/83 (14 Aug 2023 02:03) (103/83 - 133/82)  BP(mean): --  RR: 18 (14 Aug 2023 02:03) (16 - 18)  SpO2: 98% (14 Aug 2023 02:03) (98% - 98%)    Parameters below as of 14 Aug 2023 02:03  Patient On (Oxygen Delivery Method): room air        PHYSICAL EXAM:   General: Patient is doing well and lying in bed comfortably  Constitutional: alert and oriented   Pulm: Nonlabored breathing, no respiratory distress  CV: Regular rate and rhythm, normal sinus rhythm  Abd:  soft, mildly tender right abdomen, nondistended. No rebound, no guarding.   Extremities: warm, well perfused, no edema    LABS:                        13.4   8.05  )-----------( 188      ( 14 Aug 2023 01:54 )             39.9     08-    137  |  100  |  8   ----------------------------<  121<H>  3.7   |  27  |  0.72    Ca    9.6      14 Aug 2023 01:54    TPro  7.0  /  Alb  4.3  /  TBili  0.4  /  DBili  x   /  AST  17  /  ALT  10  /  AlkPhos  42      PT/INR - ( 14 Aug 2023 01:54 )   PT: 10.9 sec;   INR: 0.95          PTT - ( 14 Aug 2023 01:54 )  PTT:32.6 sec  Urinalysis Basic - ( 14 Aug 2023 01:54 )    Color: Yellow / Appearance: Clear / S.015 / pH: x  Gluc: 121 mg/dL / Ketone: NEGATIVE  / Bili: Negative / Urobili: 0.2 E.U./dL   Blood: x / Protein: NEGATIVE mg/dL / Nitrite: NEGATIVE   Leuk Esterase: Trace / RBC: < 5 /HPF / WBC < 5 /HPF   Sq Epi: x / Non Sq Epi: x / Bacteria: Present /HPF        RADIOLOGY & ADDITIONAL STUDIES:    CT Abdomen and Pelvis w/ Oral Cont and w/ IV Cont:   ACC: [id number] EXAM:  CT ABDOMEN AND PELVIS OC IC   ORDERED BY: NICHOLAS KAM     PROCEDURE DATE:  2023          INTERPRETATION:  CLINICAL INFORMATION: Appendicitis status post 4 days of   antibiotics. Worsening pain.    COMPARISON: None available.    CONTRAST/COMPLICATIONS:  IV Contrast: Isovue 370  95 cc administered   5 cc discarded  Oral Contrast: Administered  Complications: None reported at time of study completion    PROCEDURE:  CT of the Abdomen and Pelvis was performed.  Sagittal and coronal reformats were performed.    FINDINGS:  LOWER CHEST: Clear lung bases. Refluxed contrast within the dependent   distal esophagus.    LIVER: Within normal limits.  BILE DUCTS: Normal caliber.  GALLBLADDER: Within normal limits.  SPLEEN: Within normal limits.  PANCREAS: Within normal limits.  ADRENALS: Within normal limits.  KIDNEYS/URETERS: Within normal limits.    BLADDER: Within normal limits.  REPRODUCTIVE ORGANS: Retroverted/anteflexed uterus 2.3 cm left adnexal   cyst.    BOWEL: Nobowel obstruction. Appendix is normal. Moderate stool burden   throughout the ascending and transverse colon. No appreciable bowel wall   thickening.  PERITONEUM: Trace pelvic ascites. No free air.  VESSELS: Within normal limits.  RETROPERITONEUM/LYMPH NODES: No lymphadenopathy.  ABDOMINAL WALL: Within normal limits.  BONES: Within normal limits.    IMPRESSION:  No evidence of acute appendicitis. No extraluminal collection or free air.    --- End of Report ---          TAVIA VU MD; Resident Radiologist  This document has been electronically signed.  ALCIDES ANTON MD; Attending Radiologist  This document has been electronically signed. Aug 14 2023  4:50AM (23 @ 02:46)

## 2023-08-14 NOTE — ED PROVIDER NOTE - WET READ LAUNCH FT
LOV 4/30/21    NOV 11/5/21    Accu check avia has been recalled patient needs new script  Going to [facility] [address] 
There are no Wet Read(s) to document.

## 2023-08-14 NOTE — ED ADULT NURSE NOTE - NS_ED_NURSE_TEACHING_TOPIC_ED_A_ED
Spine appears normal, range of motion is not limited, no muscle or joint tenderness. +resting tremor and parkinsonian features. Unable to walk due to unsteady gait Digestive/Medications

## 2023-08-14 NOTE — ED PROVIDER NOTE - PATIENT PORTAL LINK FT
You can access the FollowMyHealth Patient Portal offered by [facility] by registering at the following website: [email or web address]. By joining [employer]’s FollowMyHealth portal, you will also be able to view your health information using other applications (apps) compatible with our system.

## 2023-08-14 NOTE — ED ADULT NURSE NOTE - OBJECTIVE STATEMENT
Pt is a+ox4 c/o abd pain worsening since friday. Dx with appendicitis but chose d/c with oral abx 32 yo F presents to the ED co worsening RLQ abdominal pain s/p appendicitis dx on 8/11. Pt awake and alert, AOx4. Pt reports she was dx with appendicitis and dc'd with antibiotics. Pt reports the pain increased over the last 24 hours. Pt reports she has been nauseous, but denies vomiting. Pt denies CP, SOB dizziness, lightheadedness, urinary sx, f/c, ha, numbness, tingling, vision changed.

## 2023-08-15 ENCOUNTER — APPOINTMENT (OUTPATIENT)
Dept: BARIATRICS | Facility: CLINIC | Age: 33
End: 2023-08-15
Payer: COMMERCIAL

## 2023-08-15 VITALS
SYSTOLIC BLOOD PRESSURE: 116 MMHG | HEIGHT: 63 IN | HEART RATE: 66 BPM | WEIGHT: 125 LBS | TEMPERATURE: 98.4 F | OXYGEN SATURATION: 98 % | DIASTOLIC BLOOD PRESSURE: 77 MMHG | BODY MASS INDEX: 22.15 KG/M2

## 2023-08-15 PROBLEM — N83.209 UNSPECIFIED OVARIAN CYST, UNSPECIFIED SIDE: Chronic | Status: ACTIVE | Noted: 2023-08-11

## 2023-08-15 PROBLEM — Z00.00 ENCOUNTER FOR PREVENTIVE HEALTH EXAMINATION: Status: ACTIVE | Noted: 2023-08-15

## 2023-08-15 PROBLEM — R55 SYNCOPE AND COLLAPSE: Chronic | Status: ACTIVE | Noted: 2023-08-11

## 2023-08-15 PROCEDURE — 99204 OFFICE O/P NEW MOD 45 MIN: CPT

## 2023-08-16 DIAGNOSIS — K35.80 UNSPECIFIED ACUTE APPENDICITIS: ICD-10-CM

## 2023-08-16 NOTE — PHYSICAL EXAM
[Respiratory Effort] : normal respiratory effort [Normal Rate and Rhythm] : normal rate and rhythm [Alert] : alert [Oriented to Person] : oriented to person [Oriented to Place] : oriented to place [Oriented to Time] : oriented to time [Calm] : calm [JVD] : no jugular venous distention  [de-identified] : NAD well appearing [de-identified] : NCAT, no icterus [de-identified] : soft, non-distended [de-identified] : no c/c/e

## 2023-08-16 NOTE — ASSESSMENT
[FreeTextEntry1] : Ms. Mahan is a very pleasant 34 y/o woman with recent visit x2 to ED.  On evaluation today she may have had early acute tip appendicitis which rapidly resolved with antibiotics. Other symptoms are c/w with IBS / constipation.  we spoke in detail about this and options for her stool burden. we reviewed options for observation vs lap interval appendectomy, which would likely decrease future need for CT scans if she has additional episodes of pain, constipation or ovarian related symptoms.  We discussed risks of surgery and expected chu-op an dpost op course and she wishes to proceed, especially to avoid future appendicitis while traveling, which she does frequently for work.

## 2023-08-16 NOTE — HISTORY OF PRESENT ILLNESS
[de-identified] : Pt is a 34 y/o woman who presents for evaluation and management of abdominal pain. She was seen in the ED x 2 last week for lower abdominal pain and cramping. She reports hx IBS with some prior episodes of similar pain however this was more severe than in the past. She was started on antibiotics 5 days ago. She denies any fevers or chills and has been eating well since then. At the second ED visit she was found to have high stool burden. She reports baseline daily BM and no recent change however she does report increase stress and travel lately for work.

## 2023-08-16 NOTE — PLAN
[FreeTextEntry1] : 1. She will be scheduled for interval lap appendectomy at [facility], she does not have significant cardiopulmonary risks and is stable for general anesthesia and surgery. 2. She will finish current course of antibx 3. She is aware of signs that would indicate need for urgent re-evaluation prior to the scheduled surgery date 4. She will make sure to stay well hydrated and increase fiber intake

## 2023-10-03 ENCOUNTER — NON-APPOINTMENT (OUTPATIENT)
Age: 33
End: 2023-10-03

## 2023-10-03 ENCOUNTER — TRANSCRIPTION ENCOUNTER (OUTPATIENT)
Age: 33
End: 2023-10-03

## 2023-10-04 ENCOUNTER — TRANSCRIPTION ENCOUNTER (OUTPATIENT)
Age: 33
End: 2023-10-04

## 2023-10-04 ENCOUNTER — APPOINTMENT (OUTPATIENT)
Dept: SURGERY | Facility: HOSPITAL | Age: 33
End: 2023-10-04

## 2023-10-04 ENCOUNTER — OUTPATIENT (OUTPATIENT)
Dept: OUTPATIENT SERVICES | Facility: HOSPITAL | Age: 33
LOS: 1 days | Discharge: HOME | End: 2023-10-04
Payer: COMMERCIAL

## 2023-10-04 ENCOUNTER — RESULT REVIEW (OUTPATIENT)
Age: 33
End: 2023-10-04

## 2023-10-04 VITALS
WEIGHT: 125 LBS | SYSTOLIC BLOOD PRESSURE: 146 MMHG | RESPIRATION RATE: 16 BRPM | DIASTOLIC BLOOD PRESSURE: 74 MMHG | HEART RATE: 89 BPM | HEIGHT: 63 IN | OXYGEN SATURATION: 99 % | TEMPERATURE: 98 F

## 2023-10-04 VITALS
HEART RATE: 90 BPM | DIASTOLIC BLOOD PRESSURE: 88 MMHG | SYSTOLIC BLOOD PRESSURE: 103 MMHG | OXYGEN SATURATION: 99 % | RESPIRATION RATE: 12 BRPM

## 2023-10-04 PROCEDURE — 44970 LAPAROSCOPY APPENDECTOMY: CPT

## 2023-10-04 PROCEDURE — 88304 TISSUE EXAM BY PATHOLOGIST: CPT | Mod: 26

## 2023-10-04 RX ORDER — METOCLOPRAMIDE HCL 10 MG
10 TABLET ORAL ONCE
Refills: 0 | Status: DISCONTINUED | OUTPATIENT
Start: 2023-10-04 | End: 2023-10-04

## 2023-10-04 RX ORDER — SODIUM CHLORIDE 9 MG/ML
1000 INJECTION, SOLUTION INTRAVENOUS
Refills: 0 | Status: DISCONTINUED | OUTPATIENT
Start: 2023-10-04 | End: 2023-10-04

## 2023-10-04 RX ORDER — HYDROMORPHONE HYDROCHLORIDE 2 MG/ML
0.25 INJECTION INTRAMUSCULAR; INTRAVENOUS; SUBCUTANEOUS
Refills: 0 | Status: DISCONTINUED | OUTPATIENT
Start: 2023-10-04 | End: 2023-10-04

## 2023-10-04 RX ORDER — ONDANSETRON 8 MG/1
4 TABLET, FILM COATED ORAL ONCE
Refills: 0 | Status: DISCONTINUED | OUTPATIENT
Start: 2023-10-04 | End: 2023-10-04

## 2023-10-04 RX ORDER — ACETAMINOPHEN 500 MG
1000 TABLET ORAL ONCE
Refills: 0 | Status: COMPLETED | OUTPATIENT
Start: 2023-10-04 | End: 2023-10-04

## 2023-10-04 RX ORDER — OXYCODONE HYDROCHLORIDE 5 MG/1
5 TABLET ORAL ONCE
Refills: 0 | Status: DISCONTINUED | OUTPATIENT
Start: 2023-10-04 | End: 2023-10-04

## 2023-10-04 RX ORDER — FENTANYL CITRATE 50 UG/ML
25 INJECTION INTRAVENOUS
Refills: 0 | Status: DISCONTINUED | OUTPATIENT
Start: 2023-10-04 | End: 2023-10-04

## 2023-10-04 RX ORDER — SODIUM CHLORIDE 9 MG/ML
500 INJECTION, SOLUTION INTRAVENOUS ONCE
Refills: 0 | Status: COMPLETED | OUTPATIENT
Start: 2023-10-04 | End: 2023-10-04

## 2023-10-04 RX ADMIN — FENTANYL CITRATE 25 MICROGRAM(S): 50 INJECTION INTRAVENOUS at 14:31

## 2023-10-04 RX ADMIN — FENTANYL CITRATE 25 MICROGRAM(S): 50 INJECTION INTRAVENOUS at 14:47

## 2023-10-04 RX ADMIN — FENTANYL CITRATE 25 MICROGRAM(S): 50 INJECTION INTRAVENOUS at 15:02

## 2023-10-04 RX ADMIN — SODIUM CHLORIDE 1000 MILLILITER(S): 9 INJECTION, SOLUTION INTRAVENOUS at 15:06

## 2023-10-04 RX ADMIN — Medication 1000 MILLIGRAM(S): at 15:31

## 2023-10-04 RX ADMIN — Medication 400 MILLIGRAM(S): at 15:16

## 2023-10-04 RX ADMIN — SODIUM CHLORIDE 100 MILLILITER(S): 9 INJECTION, SOLUTION INTRAVENOUS at 15:05

## 2023-10-04 NOTE — ASU PATIENT PROFILE, ADULT - FALL HARM RISK - PT AGE POPULATION HIDDEN
- 40 mg lovenox injection daily DISCONTINUED on 10/7 given downtrending Hgb CT thoracic/lumbar w/o reviewed. No focal findings on MSKL exam on admission.  - PT/OT eval and treat  - 650 mg PO tylenol q 6 hrs scheduled for pain  - capsaicin cream ordered BID  - oxy 2.5 PRN - likely due to suspected pancreatic cancer   - Regular diet with Ensure BID supplementation  - GOC discussed, see separate note. Pt DNR/DNI.  - Palliative care consulted, planning for hospice - 40 mg lovenox injection daily DISCONTINUED on 10/7 given downtrending Hgb CT thoracic/lumbar w/o reviewed. No focal findings on MSKL exam on admission.  - PT--MOHSEN  - 650 mg PO tylenol q 6 hrs scheduled for pain  - oxy 2.5 PRN CT thoracic/lumbar w/o reviewed. No focal findings on MSKL exam on admission.  - PT/OT eval and treat  - 650 mg PO tylenol q 6 hrs scheduled for pain  - capsaicin cream ordered BID  - oxy 2.5 PRN - likely due to suspected pancreatic cancer   - Regular diet with Ensure BID supplementation  - GOC discussed, see separate note. Pt DNR/DNI.  - Palliative care consulted, planning for hospice Adult

## 2023-10-04 NOTE — BRIEF OPERATIVE NOTE - OPERATION/FINDINGS
Patient was placed under general anesthesia and laparoscopic access obtained by supraumbilical midline cutdown with two other ports on the left and right abdomen. Appendix identified (non-perforated, non-gangrenous). Cecum bluntly mobilized. Mesoappendix divided using electrocautery. Appendix amputated at base near cecum using EndoGIA stapler, tan load. Stump inspected laparoscopically. Appendix was removed without incident and fascia closed w/ Vicryl sutures. Umbilical hernia repair with Maxoon suture.

## 2023-10-04 NOTE — ASU DISCHARGE PLAN (ADULT/PEDIATRIC) - ASU DC SPECIAL INSTRUCTIONSFT
Please follow-up with Dr. Edgar at the [facility] on 10/24.   Please take Tylenol/Ibuprofen as needed for pain every 6 hours. You may take oxycodone for severe breakthrough pain (4 tablets max a day)  You may start showering in 2 days. Do not rub the incisions, pat them dry.    General Discharge Instructions:  Please get plenty of rest, continue to ambulate several times per day, and drink adequate amounts of fluids. Avoid lifting weights greater than 5-10 lbs until you follow-up with your surgeon, who will instruct you further regarding activity restrictions.  Avoid driving or operating heavy machinery while taking pain medications.  Incision Care:  *Please call your doctor or nurse practitioner if you have increased pain, swelling, redness, or drainage from the incision site.  *Avoid swimming and baths until for at least 4 weeks  *You may shower, and wash surgical incisions with a mild soap and warm water. Gently pat the area dry.  *If you have steri-strips, they will fall off on their own. Please remove any remaining strips 7-10 days after surgery.

## 2023-10-04 NOTE — PRE-ANESTHESIA EVALUATION ADULT - NSANTHOSAYNRD_GEN_A_CORE
No. KARI screening performed.  STOP BANG Legend: 0-2 = LOW Risk; 3-4 = INTERMEDIATE Risk; 5-8 = HIGH Risk

## 2023-10-04 NOTE — ASU DISCHARGE PLAN (ADULT/PEDIATRIC) - CARE PROVIDER_API CALL
Carla Edgar Emmanuel  Surgery  [address] [place] [address], [address]  [place] [address]  Phone: [phone]  Fax: [phone]  Follow Up Time:

## 2023-10-04 NOTE — BRIEF OPERATIVE NOTE - NSICDXBRIEFPOSTOP_GEN_ALL_CORE_FT
POST-OP DIAGNOSIS:  Appendicitis, unspecified appendicitis type 04-Oct-2023 14:40:27 and umbilical hernia Latonia Gaston

## 2023-10-04 NOTE — ASU DISCHARGE PLAN (ADULT/PEDIATRIC) - NS MD DC FALL RISK RISK
For information on Fall & Injury Prevention, visit: [email or web address] OR  [email or web address] OR  https://www.cdc.gov/steadi/patient.html

## 2023-10-04 NOTE — ASU PREOP CHECKLIST - WEIGHT IN KG
ANNUAL EXAM note      History    Jenny Bowling is a 52 year old female who presents for an annual exam.  Patient is here for physical however has several concerns. Her main concern is her alcohol use. Patient typically drinks nightly at least 4 drinks a night and realizes she has a problem. Her last drink was on Saturday. Denies any withdrawal symptoms such as seizures or DTs but is concerned that she has a alcohol problem. Patient's father was an alcoholic. Patient's  also drinks however patient is actively trying not to drink and to get help. Patient feels all her other medical conditions including hair loss, toes tingling as well as tongue discoloration is related to her alcoholism. Patient has been drinking regularly for the last 8-10 years. Patient does not miss work or have difficulties working due to her drinking. Typically does not have a drink until evening. Patient's shoulder and ages 27 and 24 do realize she drinks also. Patient has attended AA meetings in the past.  Does notice difficulty sleeping at times as well as anxiety in the evening which is controlled with alcohol. Patient does not like taking medication.  Patient also has noticed elevation in blood pressure the last several times. Patient is willing to take blood pressure meds.    Last Menstrual Period: Patient's last menstrual period was 04/20/2017 (exact date).  Last PAP: 9/2016  Birth Control Method: sterilization  Last Mammogram: 10/2016  Self Breast Exam:  yes  Last Dexascan (BMD):  no  Calcium Intake: thru diet  Vitamin D Intake: 1000   Last Colonoscopy: no    NUTRITION (Servings per day)  Vegetables 3-4  Fruit 1  Dairy 2  Protein 3  Carbs 4-5  Water 48 oz  Caffeine: 2 cups coffee  Dining out in one week: 5    EXERCISE   Walks daily     medical history    Past Medical History:   Diagnosis Date   • Anemia     occasional low iron   • Cough     probably to due allergies   • Fibroadenosis of breast - Left - needle Bx 9/2/10 9/2/10     fibroadenoma   • Multiple thyroid nodules    • PONV (postoperative nausea and vomiting)    • Premenstrual tension syndromes 2010       SURGICAL history    Past Surgical History:   Procedure Laterality Date   • BIOPSY OF BREAST, NEEDLE CORE  9/2/10    Breast Biopsy-needle -fibroadenoma   •  SECTION, CLASSIC     •  SECTION, LOW TRANSVERSE  1994   • CONIZATION CERVIX,LOOP ELECTRD  2007    LEEP, focal PAVAN Ill, margins free   • GENITAL SURG PROC, FEMALE UNLISTED  2002    removal polypoid lesion from the R labia  Dr. Samayoa   • HERNIA REPAIR      umbilical   • REMOVE TONSILS/ADENOIDS,<13 Y/O  age 18    Tonsillectomy w Adenoids   • REPAIR ING HERNIA,5+Y/O,REDUCIBL      Hernia, incisional   • THYROID LOBECTOMY  2014    Dr. Morgan, left, hyperplasia, non cancer   • TONSILLECTOMY AND ADENOIDECTOMY         social history    Social History     Social History   • Marital status:      Spouse name: Nikita   • Number of children: 2   • Years of education: N/A     Occupational History   •  [employer]     Social History Main Topics   • Smoking status: Never Smoker   • Smokeless tobacco: Never Used   • Alcohol use Yes      Comment: 4 drinks 5 x a week   • Drug use: No   • Sexual activity: Yes     Partners: Male     Birth control/ protection: Surgical      Comment:  had vasectomy     Other Topics Concern   • None     Social History Narrative       family history    Family History   Problem Relation Age of Onset   • Heart Father      MI   • Heart disease Father    • Thyroid Father    • High cholesterol Father    • OTHER Mother      obesity   • Cancer Mother 68     lung   • Cancer Other      2015-neg br, ut, ov, co        mEDICATIONS    Current Outpatient Prescriptions   Medication Sig   • folic acid (FOLATE) 1 MG tablet Take 1 tablet by mouth daily.   • thiamine (VITAMIN B1) 100 MG tablet Take 1 tablet by mouth daily.   •  lisinopril-hydroCHLOROthiazide (PRINZIDE,ZESTORETIC) 10-12.5 MG per tablet Take 1 tablet by mouth daily.     No current facility-administered medications for this visit.        aLLERGIES    ALLERGIES:  No Known Allergies    Review of systems      Constitutional:  Denies fevers, chills, weakness, fatigue, loss of appetite, abnormal weight gain or abnormal weight loss.    Eyes:  Denies blindness, blurred vision, double vision, pain, itching or burning.    HENT:  Denies facial pain, ear pain, hearing loss, tinnitus, nasal congestion, rhinorrhea, epistaxis, sinus pain, mouth lesions or sore throat.    Respiratory:  Denies SOB, cough, sputum production, hemoptysis or wheezing.    Cardiovascular:  Denies chest pains, palpitations, tachycardia, edema, cyanosis or vertigo.    Gastrointestinal:  Denies abdominal pain, heartburn, nausea, vomiting, diarrhea, constipation or blood in stool.    Musculoskeletal:  Denies back pain, joint pain, joint swelling or tenderness, muscle pains or spasms. Denies neck pain, stiffness or swelling.    Neurologic:  Denies numbness, tingling, other sensory changes, sudden weakness in arms or legs. Denies confusion, headache, dizziness, memory loss or tremors.    Genitourinary:  Denies urinary frequency, nocturia, urgency, incontinence, dysuria or hematuria.    Hematologic/Lymph:  Denies easy bruising or bleeding, swollen lymph glands.    Endocrine:  Denies heat or cold intolerance, polydipsia or polyuria.  Denies changes in hair or skin texture.    Integument:  Denies new rashes or lesions, pruritus or dryness of skin.    Psychiatric:  Denies anxiety, depression, hallucinations,   Allergic/Immunologic:  Denies recurrent infections, hypersensitivity.      All other Review of Systems negative.      Physical Exam    Vital Signs:  Blood pressure (!) 160/100, pulse 72, height 5' 4\" (1.626 m), weight 66.2 kg, last menstrual period 04/20/2017. Body mass index is 25.06 kg/(m^2).  General:  Well  developed, well nourished. In no apparent distress.    Eyes:  PERRL, EOMI. Conjunctivae pink. Sclerae anicteric.    HENT:  Normocephalic, atraumatic. Bilateral external ears are normal. Mucosal membranes moist. External nose is normal. Oropharynx is clear.    Neck:  Supple. Nontender. Normal range of motion. No masses. No thyromegaly.  Trachea midline.  Respiratory:  Normal respiratory effort. No chest wall tenderness. Lungs clear to auscultation bilaterally. Symmetrical chest expansion.    Cardiovascular:  Regular rate and rhythm. No murmurs, rubs, or gallops. Normal S1 and S2. No S3 or S4. . Good dorsalis pedis pulses bilaterally. No peripheral edema.  Gastrointestinal:  Soft. Nontender. Nondistended. Normal bowel sounds. No pulsatile or other abdominal masses. No hepatosplenomegaly or splenomegaly.      Musculoskeletal:  No clubbing or cyanosis. Full range of motion in all 4 extremities proximal and distal. No joint swelling or tenderness in right and left shoulders, elbows, wrists and fingers. No joint swelling or tenderness in left and right knees, ankles and toes.    Neurologic:  Alert and oriented x 3. Gait is normal. Normal sensory function. No motor deficits in all 4 extremities. Cranial nerves II-XII intact. Symmetrical bilateral knee DTR’s.  Negative Babinski.    Integumentary:  Warm. Dry. Pink. No rashes or lesions. No wounds.    Lymphatic:  No lymphadenopathy in submental, submandibular or cervical chain. No supraclavicular or infraclavicular lymphadenopathy. No axillary or inguinal/groin lymphadenopathy.    Psychiatric: Cooperative. Appropriate mood and affect. Normal judgment.  Answers questions appropriately, no tremor    Results    Pertinent labs and imaging studies reviewed.      Assessment  1. Annual physical exam -fasting labs ordered like below    2. Alcohol dependence with alcohol-induced mood disorder (CMS/HCC) -referral to behavioral health for therapy and alcohol program. Patient was  encouraged to follow-up with the therapist to help treat her alcohol dependence. We will check vitamin levels including folate, thiamine and vitamin D. Will begin thiamine 100 mg daily and folate acid 1 mg daily    3. Left thyroid nodule -check ultrasound for follow-up    4. Benign essential hypertension -new onset. Likely worsened due to alcohol. Will begin lisinopril 10/12.5 mg once daily and patient was told to follow-up in 2 weeks for blood pressure check    5. Need for hepatitis C screening test    6. Screen for colon cancer -colonoscopy ordered        PLAN:  Orders Placed This Encounter   • OPEN ACCESS COLONOSCOPY   • US Thyroid Only   • Lipid Panel with Reflex   • Comprehensive Metabolic Panel   • CBC & Auto Differential   • Thyroid Stimulating Hormone   • Vitamin D -25 Hydroxy   • Vitamin B12 Level   • Folate Level   • Urinalysis with Micro & Culture if Indicated   • Free T4   • Hepatitis C Antibody with Reflex   • SERVICE TO BEHAVIORAL HEALTH   • folic acid (FOLATE) 1 MG tablet   • thiamine (VITAMIN B1) 100 MG tablet   • lisinopril-hydroCHLOROthiazide (PRINZIDE,ZESTORETIC) 10-12.5 MG per tablet       Return in about 2 weeks (around 6/20/2017) for BP check.               56.7

## 2023-10-04 NOTE — BRIEF OPERATIVE NOTE - NSICDXBRIEFPREOP_GEN_ALL_CORE_FT
PRE-OP DIAGNOSIS:  Appendicitis, unspecified appendicitis type 04-Oct-2023 14:40:13 Latonia Christensen

## 2023-10-08 PROCEDURE — 74177 CT ABD & PELVIS W/CONTRAST: CPT

## 2023-10-08 PROCEDURE — 87086 URINE CULTURE/COLONY COUNT: CPT

## 2023-10-08 PROCEDURE — 83690 ASSAY OF LIPASE: CPT

## 2023-10-08 PROCEDURE — 80053 COMPREHEN METABOLIC PANEL: CPT

## 2023-10-08 PROCEDURE — 99285 EMERGENCY DEPT VISIT HI MDM: CPT | Mod: 25

## 2023-10-08 PROCEDURE — 96361 HYDRATE IV INFUSION ADD-ON: CPT

## 2023-10-08 PROCEDURE — 76830 TRANSVAGINAL US NON-OB: CPT

## 2023-10-08 PROCEDURE — 84702 CHORIONIC GONADOTROPIN TEST: CPT

## 2023-10-08 PROCEDURE — 85025 COMPLETE CBC W/AUTO DIFF WBC: CPT

## 2023-10-08 PROCEDURE — 96360 HYDRATION IV INFUSION INIT: CPT

## 2023-10-08 PROCEDURE — 36415 COLL VENOUS BLD VENIPUNCTURE: CPT

## 2023-10-08 PROCEDURE — 76856 US EXAM PELVIC COMPLETE: CPT

## 2023-10-08 PROCEDURE — 81003 URINALYSIS AUTO W/O SCOPE: CPT

## 2023-10-11 ENCOUNTER — APPOINTMENT (OUTPATIENT)
Dept: SURGERY | Facility: AMBULATORY SURGERY CENTER | Age: 33
End: 2023-10-11

## 2023-10-20 LAB
SURGICAL PATHOLOGY STUDY: SIGNIFICANT CHANGE UP
SURGICAL PATHOLOGY STUDY: SIGNIFICANT CHANGE UP

## 2023-10-24 ENCOUNTER — APPOINTMENT (OUTPATIENT)
Dept: SURGERY | Facility: CLINIC | Age: 33
End: 2023-10-24
Payer: COMMERCIAL

## 2023-10-24 VITALS
SYSTOLIC BLOOD PRESSURE: 133 MMHG | HEART RATE: 80 BPM | HEIGHT: 63 IN | BODY MASS INDEX: 21.97 KG/M2 | WEIGHT: 124 LBS | OXYGEN SATURATION: 97 % | DIASTOLIC BLOOD PRESSURE: 85 MMHG

## 2023-10-24 DIAGNOSIS — K42.9 UMBILICAL HERNIA W/OUT OBSTRUCTION OR GANGRENE: ICD-10-CM

## 2023-10-24 DIAGNOSIS — Z82.49 FAMILY HISTORY OF ISCHEMIC HEART DISEASE AND OTHER DISEASES OF THE CIRCULATORY SYSTEM: ICD-10-CM

## 2023-10-24 DIAGNOSIS — Z80.3 FAMILY HISTORY OF MALIGNANT NEOPLASM OF BREAST: ICD-10-CM

## 2023-10-24 DIAGNOSIS — Z83.438 FAMILY HISTORY OF OTHER DISORDER OF LIPOPROTEIN METABOLISM AND OTHER LIPIDEMIA: ICD-10-CM

## 2023-10-24 DIAGNOSIS — K35.80 UNSPECIFIED ACUTE APPENDICITIS: ICD-10-CM

## 2023-10-24 DIAGNOSIS — Z78.9 OTHER SPECIFIED HEALTH STATUS: ICD-10-CM

## 2023-10-24 PROCEDURE — 99024 POSTOP FOLLOW-UP VISIT: CPT

## 2024-04-10 NOTE — ED ADULT NURSE NOTE - PRIMARY CARE PROVIDER
Patient: Ellen Zhu    Procedure Summary       Date: 04/10/24 Room / Location:  [facility] OR 09 /  [facility]    Anesthesia Start: 0821 Anesthesia Stop: 1025    Procedure: VAGINAL HYSTERECTOMY WITH ANTERIOR VAGINAL REPAIR and cystoscopy (Uterus) Diagnosis:       Uterine prolapse      Cystocele, midline      (Uterine prolapse [N81.4])      (Cystocele, midline [N81.11])    Surgeons: Cesar Rosales MD Provider: Jomar Mccrary CRNA    Anesthesia Type: general ASA Status: 3            Anesthesia Type: general    Vitals  Vitals Value Taken Time   /61 04/10/24 1132   Temp 97.7 °F (36.5 °C) 04/10/24 1132   Pulse 70 04/10/24 1132   Resp 16 04/10/24 1132   SpO2 94 % 04/10/24 1132           Post Anesthesia Care and Evaluation    Patient location during evaluation: PHASE II  Patient participation: complete - patient participated  Level of consciousness: awake and awake and alert  Pain score: 0  Pain management: adequate    Airway patency: patent  Anesthetic complications: No anesthetic complications  PONV Status: none  Cardiovascular status: acceptable  Respiratory status: acceptable  Hydration status: acceptable    Comments: Patient discharged according to acceptable George score per RN assessment. See nursing records for further information.     Blood pressure 106/61, pulse 70, temperature 97.7 °F (36.5 °C), temperature source Temporal, resp. rate 16, SpO2 94%, not currently breastfeeding.      
unknown

## 2024-05-14 NOTE — ED PROVIDER NOTE - NPI NUMBER (FOR SYSADMIN USE ONLY) :
[[email or web address]] : 78F PMH NICM (EF 51%), severe AS s/p bioprosthetic AVR (2011), AICD/PPM, CABG, a. fib on Eliquis, HTN, GERD, osteopenia w/ R humeral/pelvic fracture who presents for follow up.  #Insomnia - Ambien 10 pills provided (to take 0.5 pills as needed) - Sleep medicine referral provided - Will need 3 month follow up given controlled substance prescription  #HTN - BP within goal today - Continue current management  #HCM - Not interested in colonoscopy/mammography- this has been discussed in detail in the past - Not interested in Tdap, PCV-20 at this time - RTC 3 months  Case discussed with Dr. Jeffry Farah MD PGY-3, [facility]
[[id number]]

## 2024-10-16 NOTE — ED ADULT TRIAGE NOTE - HISTORY OF COVID-19 VACCINATION
Patient states that she received a my chart message today from July about her H. Pylori.  Patient aware that she was already treated for the H. Pylori and that there is nothing new at this time. Patient states that she had never gone back to have her stool test completed.  She states she will go have the follow up stool test done.      Yes

## 2024-12-18 NOTE — ASU DISCHARGE PLAN (ADULT/PEDIATRIC) - ACTIVITY LEVEL
Medication:     labetalol (NORMODYNE) 100 MG tablet    passed protocol.   Last office visit date: 06/26/2024  Next appointment scheduled?: Yes 06/25/2025  Number of refills given: 3   
No heavy lifting